# Patient Record
Sex: FEMALE | Race: WHITE | NOT HISPANIC OR LATINO | Employment: OTHER | URBAN - METROPOLITAN AREA
[De-identification: names, ages, dates, MRNs, and addresses within clinical notes are randomized per-mention and may not be internally consistent; named-entity substitution may affect disease eponyms.]

---

## 2017-02-23 ENCOUNTER — OFFICE VISIT (OUTPATIENT)
Dept: ENDOCRINOLOGY | Facility: CLINIC | Age: 67
End: 2017-02-23

## 2017-02-23 VITALS
BODY MASS INDEX: 31.72 KG/M2 | DIASTOLIC BLOOD PRESSURE: 80 MMHG | HEART RATE: 80 BPM | WEIGHT: 168 LBS | HEIGHT: 61 IN | OXYGEN SATURATION: 98 % | SYSTOLIC BLOOD PRESSURE: 140 MMHG

## 2017-02-23 DIAGNOSIS — E78.2 MIXED HYPERLIPIDEMIA: ICD-10-CM

## 2017-02-23 DIAGNOSIS — I10 ESSENTIAL HYPERTENSION: ICD-10-CM

## 2017-02-23 DIAGNOSIS — E11.9 TYPE 2 DIABETES MELLITUS WITHOUT COMPLICATION, UNSPECIFIED LONG TERM INSULIN USE STATUS: Primary | ICD-10-CM

## 2017-02-23 LAB — GLUCOSE BLDC GLUCOMTR-MCNC: 204 MG/DL (ref 70–130)

## 2017-02-23 PROCEDURE — 82947 ASSAY GLUCOSE BLOOD QUANT: CPT | Performed by: INTERNAL MEDICINE

## 2017-02-23 PROCEDURE — 99214 OFFICE O/P EST MOD 30 MIN: CPT | Performed by: INTERNAL MEDICINE

## 2017-02-23 RX ORDER — HYDROCHLOROTHIAZIDE 25 MG/1
TABLET ORAL
COMMUNITY
Start: 2017-02-21

## 2017-02-23 RX ORDER — IBUPROFEN 800 MG/1
TABLET ORAL
COMMUNITY
Start: 2017-01-08

## 2017-02-23 RX ORDER — HYDRALAZINE HYDROCHLORIDE 50 MG/1
TABLET, FILM COATED ORAL
COMMUNITY
Start: 2017-02-07

## 2017-02-23 RX ORDER — AMLODIPINE BESYLATE 5 MG/1
TABLET ORAL
COMMUNITY
Start: 2017-02-17

## 2017-02-23 NOTE — PROGRESS NOTES
Tracy AFTAB Mcclelland 66 y.o.  CC:Follow-up; Diabetes (Type II, Labs done at Dr Norman office 3 weeks ago, A1C was 8.4%); Hypothyroidism; Hypertension; and Hyperlipidemia      [place]: Follow-up; Diabetes (Type II, Labs done at Dr Norman office 3 weeks ago, A1C was 8.4%); Hypothyroidism; Hypertension; and Hyperlipidemia    Blood sugar and 90 day average sugar reviewed  Results for orders placed or performed in visit on 02/23/17   POC Glucose Fingerstick   Result Value Ref Range    Glucose 204 (A) 70 - 130 mg/dL     Average via Dr Norman was 195 (hgn a1c 8.4%)  Energy good  bp is good   Is following a low fat diet  Is up to date with eye exam  Neuropathy stable without foot callus or ulcer  Ur alb pos 10/16  Lipids reviewed- intol statin therapy   Sugars are higher - she assoc with pain and ibuprofen  Discussed raising insulin by 2 units (n and r)   Discussed taking r before meals   Is sedentary   Is eating more due to stress  Discussed adjustment of insulin for higher sugar and lower sugar  Lowest sugar 85  Is testing sugars 2 times daily  Discussed hep c screening   Intol statin due to leg pain     Allergies   Allergen Reactions   • Adhesive Tape    • Astelin [Azelastine Hcl]    • Azelastine    • Maxitrol  [Neomycin-Polymyxin-Dexameth]    • Neomycin-Bacitracin Zn-Polymyx    • Niacin    • Topiramate        Current Outpatient Prescriptions:   •  acetaminophen (ARTHRITIS PAIN RELIEF) 650 MG 8 hr tablet, Take  by mouth., Disp: , Rfl:   •  buPROPion XL (WELLBUTRIN XL) 150 MG 24 hr tablet, Take  by mouth daily., Disp: , Rfl:   •  carBAMazepine XR (TEGretol  XR) 200 MG 12 hr tablet, Take  by mouth., Disp: , Rfl:   •  carBAMazepine XR (TEGRETOL-XR) 100 MG 12 hr tablet, Take  by mouth daily., Disp: , Rfl:   •  carisoprodol (SOMA) 350 MG tablet, Take  by mouth., Disp: , Rfl:   •  clobetasol (TEMOVATE) 0.05 % cream, , Disp: , Rfl:   •  clotrimazole (LOTRIMIN) 1 % external solution, Apply  topically., Disp: , Rfl:   •  diclofenac  (VOLTAREN) 1 % gel gel, Place  on the skin., Disp: , Rfl:   •  Difluprednate (DUREZOL) 0.05 % emulsion, Apply  to eye., Disp: , Rfl:   •  dorzolamide (TRUSOPT) 2 % ophthalmic solution, Apply  to eye 2 (two) times a day., Disp: , Rfl:   •  Glucosamine-Chondroit-Vit C-Mn (GLUCOSAMINE CHONDR 1500 COMPLX PO), Take 2 capsules by mouth 2 (two) times a day., Disp: , Rfl:   •  glucose blood (ACCU-CHEK KEN PLUS) test strip, 3 (three) times a day., Disp: , Rfl:   •  HYDROcodone-acetaminophen (NORCO) 7.5-325 MG per tablet, , Disp: , Rfl:   •  insulin NPH (HumuLIN,NovoLIN) 100 UNIT/ML injection, Take 20 units sq q am and 30 units sq q hs, Disp: 50 mL, Rfl: 1  •  insulin regular (NOVOLIN R) 100 UNIT/ML injection, 10-15 units tid before meals, Disp: 50 mL, Rfl: 1  •  levothyroxine (SYNTHROID, LEVOTHROID) 112 MCG tablet, Take 1 tablet by mouth., Disp: , Rfl:   •  losartan (COZAAR) 100 MG tablet, Take 1 tablet by mouth Daily., Disp: , Rfl:   •  promethazine (PHENERGAN) 25 MG tablet, Take  by mouth., Disp: , Rfl:   •  tolterodine LA (DETROL LA) 4 MG 24 hr capsule, Take  by mouth daily., Disp: , Rfl:   •  traZODone (DESYREL) 150 MG tablet, Take  by mouth., Disp: , Rfl:   Patient Active Problem List    Diagnosis   • Breast pain [N64.4]   • Hyperlipidemia [E78.5]     Overview Note:     Impression: 02/09/2016 - high LDL but does not tolerate statin or fibrates   will not follow because not able to treat (would be candidate for praluent etc if she has cardiac event).  Impression: 11/09/2015 - check flp  Impression: 02/02/2015 - discussed flps  Impression: 10/30/2014 - check flp  Impression: 07/31/2014 - get copy labs from [facility]  Impression: 04/30/2014 - flp via Dr Norman- on crestor 3 days a week.;      • Hypertension [I10]     Overview Note:     Impression: 02/09/2016 - bp 144/74   continue current medications   creatinine normal last check  Impression: 11/09/2015 - bp is good  Impression: 02/02/2015 - bp is good  Impression:  10/30/2014 - bp is good  Impression: 07/31/2014 - bp is good  Impression: 04/30/2014 - bp is good;      • Hyperthyroidism [E05.90]     Overview Note:     Impression: 02/09/2016 - normal tsh last check  Impression: 11/09/2015 - check tfts  Impression: 02/02/2015 - check tfts  Impression: 10/30/2014 - update tft  Impression: 04/30/2014 - update order for tft given;      • Paresthesia [R20.2]   • Type 2 diabetes mellitus [E11.9]     Overview Note:     Impression: 02/09/2016 - blood sugar is 127 and hgn a1c 9.5%   average sugar is 225 or so    is up to date with eye exam   no neuropathy   reviewed lab work with her   recommendations discussed but I am afraid that she will not have coverage or insulin.  She was encouraged to work with pharmacist.  Lantus and novolog not covered so we prescribed levemir and humalog   we may need to switch to relion branded products for cost- she is unable to afford    insurance does not even cover metformin per patient- she was told this was free at [facility]  Impression: 11/09/2015 - steady increase in hgn a1c    is missing daytime shots   is up to date on eye exam   neuropathy stable- no callus/ulcer   ur alb due   discussed goals, fasting sugars are good   continue to try to get shots in with meals- some of her sugars are over 500 and we discussed damage done with higher levels  Impression: 02/02/2015 - discussed higher sugars and goals   blood sugar 119, hgn a1c 8.6% '  trial lantus 50 u daily   novolog adjustment discussed   chore sheet provided  is up to date with eye exam  neuropathy is stable- no foot callus or ulcer   ur alb today  Impression: 02/02/2015 - discussed higher sugars and goals   blood sugar 119, hgn a1c 8.6%   is up to date with eye exam  neuropathy is stable- no foot callus or ulcer   ur alb today  Impression: 10/30/2014 - blood sugar 140, hgn a1c 7.8%  is up to date with eye exam  no foot lesion  bp is good  Impression: 07/31/2014 - blood sugar is good but average  sugar is higher- increase novolog 1 u per 3 carbs, lowers her 10 points  has lost 7 lbs and sugars are better  continue current dose of Lantus  is up to date with eye examm, no neuropathy  ur alb due  Impression: 04/30/2014 - blood sugar 130, hgn a1c 9.8% (average 240 or so)  discussed with patient and reinforced need for her to begin to exercise again, cut back on dietary carbs. Carb hand out was given.  Discussed insulin to carb ratio.  1 u per 7 carbs and if pp blood sugar is high then continue to titrate mealtime insulin.  Goal and risk of complications was reviewed with her at length, gave blood sugar grid and she can email anytime.;      • Vitamin D deficiency [E55.9]     Overview Note:     Impression: 02/09/2016 - low vitamin D level   recommended supplement   she states she cannot afford it  Impression: 11/09/2015 - update vitamin D levels  Impression: 07/31/2014 - continue vitamin D  Impression: 04/30/2014 - check vitamin D levels- order given;        Review of Systems   Constitutional: Negative for activity change, appetite change, chills, diaphoresis, fatigue, fever and unexpected weight change.   HENT: Negative for congestion, dental problem, drooling, ear discharge, ear pain, facial swelling, hearing loss, mouth sores, nosebleeds, postnasal drip, rhinorrhea, sinus pressure, sneezing, sore throat, tinnitus, trouble swallowing and voice change.    Eyes: Negative for photophobia, pain, discharge, redness, itching and visual disturbance.        Diplopia os- following with normal MRI    Respiratory: Negative for apnea, cough, choking, chest tightness, shortness of breath, wheezing and stridor.    Cardiovascular: Negative for chest pain, palpitations and leg swelling.   Gastrointestinal: Negative for abdominal distention, abdominal pain, anal bleeding, blood in stool, constipation, diarrhea, nausea, rectal pain and vomiting.   Endocrine: Negative for cold intolerance, heat intolerance, polydipsia, polyphagia  "and polyuria.   Genitourinary: Negative for decreased urine volume, difficulty urinating, dysuria, enuresis, flank pain, frequency, genital sores, hematuria and urgency.   Musculoskeletal: Negative for arthralgias, back pain, gait problem, joint swelling, myalgias, neck pain and neck stiffness.        Lower back and left shoulder pain  Told she had pinched nerve in neck    Skin: Negative for color change, pallor, rash and wound.        Foot deformity with cut medial toe left foot    Allergic/Immunologic: Negative for environmental allergies, food allergies and immunocompromised state.   Neurological: Positive for numbness. Negative for dizziness, tremors, seizures, syncope, facial asymmetry, speech difficulty, weakness, light-headedness and headaches.   Hematological: Negative for adenopathy. Does not bruise/bleed easily.   Psychiatric/Behavioral: Negative for agitation, behavioral problems, confusion, decreased concentration, dysphoric mood, hallucinations, self-injury, sleep disturbance and suicidal ideas. The patient is not nervous/anxious and is not hyperactive.      Social History     Social History   • Marital status:      Spouse name: N/A   • Number of children: N/A   • Years of education: N/A     Occupational History   • Not on file.     Social History Main Topics   • Smoking status: Former Smoker   • Smokeless tobacco: Never Used   • Alcohol use No   • Drug use: No   • Sexual activity: Not on file     Other Topics Concern   • Not on file     Social History Narrative     Family History   Problem Relation Age of Onset   • Diabetes Mother    • Hypertension Father    • Other Father      cardiac disorder   • Cancer Sister    • Dementia Other    • Diabetes Other      Visit Vitals   • /80   • Pulse 80   • Ht 61\" (154.9 cm)   • Wt 168 lb (76.2 kg)   • SpO2 98%   • BMI 31.74 kg/m2     Physical Exam   Constitutional: She is oriented to person, place, and time. She appears well-developed and " well-nourished.   HENT:   Head: Normocephalic and atraumatic.   Nose: Nose normal.   Mouth/Throat: Oropharynx is clear and moist.   Eyes: Conjunctivae, EOM and lids are normal. Pupils are equal, round, and reactive to light.   Neck: Trachea normal and normal range of motion. Neck supple. Carotid bruit is not present. No tracheal deviation present. No thyroid mass and no thyromegaly present.   Cardiovascular: Normal rate, regular rhythm, normal heart sounds and intact distal pulses.  Exam reveals no gallop and no friction rub.    No murmur heard.  Pulmonary/Chest: Effort normal and breath sounds normal. No respiratory distress. She has no wheezes.   Musculoskeletal: Normal range of motion. She exhibits no edema or deformity.    Diabetic foot exam performed: callus between 3rd and 4th toes- callus - discussed  toe spacer     Neurological Sensory Findings - Altered hot/cold right ankle/foot discrimination and altered hot/cold left ankle/foot discrimination. Altered sharp/dull right ankle/foot discrimination and altered sharp/dull left ankle/foot discrimination. Right ankle/foot altered proprioception and left ankle/foot altered proprioception.    Vascular Status -  Her exam exhibits right foot vasculature normal. Her exam exhibits no right foot edema. Her exam exhibits left foot vasculature normal. Her exam exhibits no left foot edema.   Skin Integrity  -  Her right foot skin is intact.     Tracy 's left foot skin is intact. .  Lymphadenopathy:     She has no cervical adenopathy.   Neurological: She is alert and oriented to person, place, and time. She has normal reflexes. She displays normal reflexes. No cranial nerve deficit.   Skin: Skin is warm and dry. No rash noted. No cyanosis or erythema. Nails show no clubbing.   Psychiatric: She has a normal mood and affect. Her speech is normal and behavior is normal. Judgment and thought content normal. Cognition and memory are normal.   Nursing note and vitals  reviewed.    Results for orders placed or performed in visit on 10/03/16   TSH   Result Value Ref Range    TSH 3.094 0.350 - 5.350 mIU/mL   Microalbumin / Creatinine Urine Ratio   Result Value Ref Range    Creatinine, Urine 147.9 Not Estab. mg/dL    Microalbumin, Urine 120.4 Not Estab. ug/mL    Microalbumin/Creatinine Ratio Urine 81.4 (H) 0.0 - 30.0 mg/g creat   Comprehensive Metabolic Panel   Result Value Ref Range    Glucose 66 (L) 70 - 100 mg/dL    BUN 13 9 - 23 mg/dL    Creatinine 0.70 0.60 - 1.30 mg/dL    Sodium 147 (H) 132 - 146 mmol/L    Potassium 4.8 3.5 - 5.5 mmol/L    Chloride 105 99 - 109 mmol/L    CO2 34.0 (H) 20.0 - 31.0 mmol/L    Calcium 9.7 8.7 - 10.4 mg/dL    Total Protein 6.4 5.7 - 8.2 g/dL    Albumin 3.90 3.20 - 4.80 g/dL    ALT (SGPT) 22 7 - 40 U/L    AST (SGOT) 16 0 - 33 U/L    Alkaline Phosphatase 70 25 - 100 U/L    Total Bilirubin 0.2 (L) 0.3 - 1.2 mg/dL    eGFR Non African Amer 83 >60 mL/min/1.73    Globulin 2.5 gm/dL    A/G Ratio 1.6 g/dL    BUN/Creatinine Ratio 18.6 7.0 - 25.0    Anion Gap 8.0 3.0 - 11.0 mmol/L   Lipid Panel   Result Value Ref Range    Total Cholesterol 247 (H) 0 - 200 mg/dL    Triglycerides 273 (H) 0 - 150 mg/dL    HDL Cholesterol 49 40 - 60 mg/dL    LDL Cholesterol  161 (H) 0 - 130 mg/dL   Vitamin D 25 Hydroxy   Result Value Ref Range    25 Hydroxy, Vitamin D 43.9 ng/ml   POC Glucose Fingerstick   Result Value Ref Range    Glucose 83 70 - 130 mg/dL     Problem List Items Addressed This Visit        Cardiovascular and Mediastinum    Hyperlipidemia     Intol statin on low fat diet  Encouraged her to exercise          Hypertension     Is on multiple medications- losartan benefits discussed          Relevant Medications    amLODIPine (NORVASC) 5 MG tablet    hydrALAZINE (APRESOLINE) 50 MG tablet    hydrochlorothiazide (HYDRODIURIL) 25 MG tablet       Endocrine    Type 2 diabetes mellitus - Primary     Blood sugar and 90 day average sugar reviewed  hgn a1c 8.4%   Discussed  raising all insulin by 2 units  Low fat diet  Continue arb  Neuropathy stable without foot lesion other than callus between left 3/4 toes  Recommend continue eye exam  Yearly   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    .Aleyda Sierra MA

## 2017-02-23 NOTE — ASSESSMENT & PLAN NOTE
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

## 2017-06-27 ENCOUNTER — OFFICE VISIT (OUTPATIENT)
Dept: ENDOCRINOLOGY | Facility: CLINIC | Age: 67
End: 2017-06-27

## 2017-06-27 VITALS
WEIGHT: 161 LBS | SYSTOLIC BLOOD PRESSURE: 158 MMHG | DIASTOLIC BLOOD PRESSURE: 74 MMHG | HEIGHT: 61 IN | BODY MASS INDEX: 30.4 KG/M2

## 2017-06-27 DIAGNOSIS — E11.9 TYPE 2 DIABETES MELLITUS WITHOUT COMPLICATION, UNSPECIFIED LONG TERM INSULIN USE STATUS: Primary | ICD-10-CM

## 2017-06-27 DIAGNOSIS — E03.9 ACQUIRED HYPOTHYROIDISM: ICD-10-CM

## 2017-06-27 DIAGNOSIS — E78.2 MIXED HYPERLIPIDEMIA: ICD-10-CM

## 2017-06-27 DIAGNOSIS — I10 ESSENTIAL HYPERTENSION: ICD-10-CM

## 2017-06-27 DIAGNOSIS — E55.9 VITAMIN D DEFICIENCY: ICD-10-CM

## 2017-06-27 LAB
ALBUMIN SERPL-MCNC: 4.3 G/DL (ref 3.2–4.8)
ALBUMIN/GLOB SERPL: 1.9 G/DL (ref 1.5–2.5)
ALP SERPL-CCNC: 98 U/L (ref 25–100)
ALT SERPL W P-5'-P-CCNC: 14 U/L (ref 7–40)
ANION GAP SERPL CALCULATED.3IONS-SCNC: 10 MMOL/L (ref 3–11)
ARTICHOKE IGE QN: 196 MG/DL (ref 0–130)
AST SERPL-CCNC: 17 U/L (ref 0–33)
BASOPHILS # BLD AUTO: 0.03 10*3/MM3 (ref 0–0.2)
BASOPHILS NFR BLD AUTO: 0.3 % (ref 0–1)
BILIRUB SERPL-MCNC: 0.2 MG/DL (ref 0.3–1.2)
BUN BLD-MCNC: 18 MG/DL (ref 9–23)
BUN/CREAT SERPL: 20 (ref 7–25)
CALCIUM SPEC-SCNC: 9.7 MG/DL (ref 8.7–10.4)
CHLORIDE SERPL-SCNC: 98 MMOL/L (ref 99–109)
CHOLEST SERPL-MCNC: 299 MG/DL (ref 0–200)
CO2 SERPL-SCNC: 31 MMOL/L (ref 20–31)
CREAT BLD-MCNC: 0.9 MG/DL (ref 0.6–1.3)
DEPRECATED RDW RBC AUTO: 44.6 FL (ref 37–54)
EOSINOPHIL # BLD AUTO: 0.12 10*3/MM3 (ref 0–0.3)
EOSINOPHIL NFR BLD AUTO: 1.3 % (ref 0–3)
ERYTHROCYTE [DISTWIDTH] IN BLOOD BY AUTOMATED COUNT: 12.2 % (ref 11.3–14.5)
GFR SERPL CREATININE-BSD FRML MDRD: 62 ML/MIN/1.73
GLOBULIN UR ELPH-MCNC: 2.3 GM/DL
GLUCOSE BLD-MCNC: 260 MG/DL (ref 70–100)
GLUCOSE BLDC GLUCOMTR-MCNC: 275 MG/DL (ref 70–130)
HBA1C MFR BLD: 8.6 %
HCT VFR BLD AUTO: 41.2 % (ref 34.5–44)
HDLC SERPL-MCNC: 52 MG/DL (ref 40–60)
HGB BLD-MCNC: 14 G/DL (ref 11.5–15.5)
IMM GRANULOCYTES # BLD: 0.03 10*3/MM3 (ref 0–0.03)
IMM GRANULOCYTES NFR BLD: 0.3 % (ref 0–0.6)
LYMPHOCYTES # BLD AUTO: 1.98 10*3/MM3 (ref 0.6–4.8)
LYMPHOCYTES NFR BLD AUTO: 22 % (ref 24–44)
MCH RBC QN AUTO: 34.1 PG (ref 27–31)
MCHC RBC AUTO-ENTMCNC: 34 G/DL (ref 32–36)
MCV RBC AUTO: 100.2 FL (ref 80–99)
MONOCYTES # BLD AUTO: 0.66 10*3/MM3 (ref 0–1)
MONOCYTES NFR BLD AUTO: 7.3 % (ref 0–12)
NEUTROPHILS # BLD AUTO: 6.2 10*3/MM3 (ref 1.5–8.3)
NEUTROPHILS NFR BLD AUTO: 68.8 % (ref 41–71)
PLATELET # BLD AUTO: 230 10*3/MM3 (ref 150–450)
PMV BLD AUTO: 10.1 FL (ref 6–12)
POTASSIUM BLD-SCNC: 3.6 MMOL/L (ref 3.5–5.5)
PROT SERPL-MCNC: 6.6 G/DL (ref 5.7–8.2)
RBC # BLD AUTO: 4.11 10*6/MM3 (ref 3.89–5.14)
SODIUM BLD-SCNC: 139 MMOL/L (ref 132–146)
T4 FREE SERPL-MCNC: 1.08 NG/DL (ref 0.89–1.76)
TRIGL SERPL-MCNC: 418 MG/DL (ref 0–150)
TSH SERPL DL<=0.05 MIU/L-ACNC: 1.66 MIU/ML (ref 0.35–5.35)
WBC NRBC COR # BLD: 9.02 10*3/MM3 (ref 3.5–10.8)

## 2017-06-27 PROCEDURE — 82947 ASSAY GLUCOSE BLOOD QUANT: CPT | Performed by: INTERNAL MEDICINE

## 2017-06-27 PROCEDURE — 80053 COMPREHEN METABOLIC PANEL: CPT | Performed by: INTERNAL MEDICINE

## 2017-06-27 PROCEDURE — 83036 HEMOGLOBIN GLYCOSYLATED A1C: CPT | Performed by: INTERNAL MEDICINE

## 2017-06-27 PROCEDURE — 80061 LIPID PANEL: CPT | Performed by: INTERNAL MEDICINE

## 2017-06-27 PROCEDURE — 85025 COMPLETE CBC W/AUTO DIFF WBC: CPT | Performed by: INTERNAL MEDICINE

## 2017-06-27 PROCEDURE — 84439 ASSAY OF FREE THYROXINE: CPT | Performed by: INTERNAL MEDICINE

## 2017-06-27 PROCEDURE — 84443 ASSAY THYROID STIM HORMONE: CPT | Performed by: INTERNAL MEDICINE

## 2017-06-27 PROCEDURE — 99214 OFFICE O/P EST MOD 30 MIN: CPT | Performed by: INTERNAL MEDICINE

## 2017-06-27 RX ORDER — IPRATROPIUM BROMIDE 42 UG/1
SPRAY, METERED NASAL
COMMUNITY
Start: 2017-06-23

## 2017-06-27 RX ORDER — NYSTATIN 100000 [USP'U]/G
POWDER TOPICAL
COMMUNITY
Start: 2017-06-22

## 2017-06-27 RX ORDER — IPRATROPIUM BROMIDE 21 UG/1
SPRAY, METERED NASAL
COMMUNITY
Start: 2017-05-02

## 2017-06-27 NOTE — ASSESSMENT & PLAN NOTE
bp is high -recheck 148/80  No exercise  Is not watching salt intake  Dr Norman just adjusted medication   Continue current medications   Check bp at home and call if over 145/85

## 2017-06-27 NOTE — PROGRESS NOTES
Tracy UGALDE Stas 66 y.o.  CC:Follow-up; Diabetes Mellitus (Type II, last eye exam was within the last six months by Dr Jaramillo); Hypothyroidism; Hypertension; and Hyperlipidemia    [place]: Follow-up; Diabetes Mellitus (Type II, last eye exam was within the last six months by Dr Jaramillo); Hypothyroidism; Hypertension; and Hyperlipidemia    Blood sugar 275 and hgn a1c 8.6%  Average sugar is 195   This is stable compared to last check 8.4%  Blood sugars reviewed and range from 72- 350 or so  Discussed goal for average sugar 7 or less (corresponding to average sugar of 150)  She is trying to get  in Nursing Home, planned surgery   He is on several waiting lists   Back surgery planned (Dr Álvarez)  Sugars have been higher, discussed increase in R to 20 units before meals  No low sugars (rare) - occ noct but no pp   utd eye exam- Dr Jaramillo   bp is higher   A lot of stress related to caring for her    On arb but not on statin     Allergies   Allergen Reactions   • Adhesive Tape    • Astelin [Azelastine Hcl]    • Azelastine    • Maxitrol  [Neomycin-Polymyxin-Dexameth]    • Neomycin-Bacitracin Zn-Polymyx    • Niacin    • Statins Myalgia     Tried crestor, lipitor, pravastatin, zocor    • Topiramate        Current Outpatient Prescriptions:   •  acetaminophen (ARTHRITIS PAIN RELIEF) 650 MG 8 hr tablet, Take  by mouth., Disp: , Rfl:   •  amLODIPine (NORVASC) 5 MG tablet, , Disp: , Rfl:   •  buPROPion XL (WELLBUTRIN XL) 150 MG 24 hr tablet, Take  by mouth daily., Disp: , Rfl:   •  carBAMazepine XR (TEGretol  XR) 200 MG 12 hr tablet, Take  by mouth., Disp: , Rfl:   •  carBAMazepine XR (TEGRETOL-XR) 100 MG 12 hr tablet, Take  by mouth daily., Disp: , Rfl:   •  insulin NPH (HumuLIN,NovoLIN) 100 UNIT/ML injection, Take 20 units sq q am and 30 units sq q hs, Disp: 50 mL, Rfl: 1  •  insulin regular (NOVOLIN R) 100 UNIT/ML injection, 10-15 units tid before meals, Disp: 50 mL, Rfl: 1  •  levothyroxine (SYNTHROID, LEVOTHROID)  112 MCG tablet, Take 1 tablet by mouth., Disp: , Rfl:   •  losartan (COZAAR) 100 MG tablet, Take 1 tablet by mouth Daily., Disp: , Rfl:   •  carisoprodol (SOMA) 350 MG tablet, Take  by mouth., Disp: , Rfl:   •  clobetasol (TEMOVATE) 0.05 % cream, , Disp: , Rfl:   •  clotrimazole (LOTRIMIN) 1 % external solution, Apply  topically., Disp: , Rfl:   •  Difluprednate (DUREZOL) 0.05 % emulsion, Apply  to eye., Disp: , Rfl:   •  dorzolamide (TRUSOPT) 2 % ophthalmic solution, Apply  to eye 2 (two) times a day., Disp: , Rfl:   •  Glucosamine-Chondroit-Vit C-Mn (GLUCOSAMINE CHONDR 1500 COMPLX PO), Take 2 capsules by mouth 2 (two) times a day., Disp: , Rfl:   •  glucose blood (ACCU-CHEK KEN PLUS) test strip, 3 (three) times a day., Disp: , Rfl:   •  hydrALAZINE (APRESOLINE) 50 MG tablet, Take one tablet TID, Disp: , Rfl:   •  hydrochlorothiazide (HYDRODIURIL) 25 MG tablet, Take 1/2 tablet PO daily, Disp: , Rfl:   •  HYDROcodone-acetaminophen (NORCO) 7.5-325 MG per tablet, , Disp: , Rfl:   •  ibuprofen (ADVIL,MOTRIN) 800 MG tablet, Take one tablet q 8 hr prn, Disp: , Rfl:   •  traZODone (DESYREL) 150 MG tablet, Take  by mouth., Disp: , Rfl:   Patient Active Problem List    Diagnosis   • Breast pain [N64.4]   • Hyperlipidemia [E78.5]     Overview Note:     Impression: 02/09/2016 - high LDL but does not tolerate statin or fibrates   will not follow because not able to treat (would be candidate for praluent etc if she has cardiac event).  Impression: 11/09/2015 - check flp  Impression: 02/02/2015 - discussed flps  Impression: 10/30/2014 - check flp  Impression: 07/31/2014 - get copy labs from [facility]  Impression: 04/30/2014 - flp via Dr Norman- on crestor 3 days a week.;      • Hypertension [I10]     Overview Note:     Impression: 02/09/2016 - bp 144/74   continue current medications   creatinine normal last check  Impression: 11/09/2015 - bp is good  Impression: 02/02/2015 - bp is good  Impression: 10/30/2014 - bp is good   Impression: 07/31/2014 - bp is good  Impression: 04/30/2014 - bp is good;      • Hyperthyroidism [E05.90]     Overview Note:     Impression: 02/09/2016 - normal tsh last check  Impression: 11/09/2015 - check tfts  Impression: 02/02/2015 - check tfts  Impression: 10/30/2014 - update tft  Impression: 04/30/2014 - update order for tft given;      • Paresthesia [R20.2]   • Type 2 diabetes mellitus [E11.9]     Overview Note:     Impression: 02/09/2016 - blood sugar is 127 and hgn a1c 9.5%   average sugar is 225 or so    is up to date with eye exam   no neuropathy   reviewed lab work with her   recommendations discussed but I am afraid that she will not have coverage or insulin.  She was encouraged to work with pharmacist.  Lantus and novolog not covered so we prescribed levemir and humalog   we may need to switch to relion branded products for cost- she is unable to afford    insurance does not even cover metformin per patient- she was told this was free at [facility]  Impression: 11/09/2015 - steady increase in hgn a1c    is missing daytime shots   is up to date on eye exam   neuropathy stable- no callus/ulcer   ur alb due   discussed goals, fasting sugars are good   continue to try to get shots in with meals- some of her sugars are over 500 and we discussed damage done with higher levels  Impression: 02/02/2015 - discussed higher sugars and goals   blood sugar 119, hgn a1c 8.6% '  trial lantus 50 u daily   novolog adjustment discussed   chore sheet provided  is up to date with eye exam  neuropathy is stable- no foot callus or ulcer   ur alb today  Impression: 02/02/2015 - discussed higher sugars and goals   blood sugar 119, hgn a1c 8.6%   is up to date with eye exam  neuropathy is stable- no foot callus or ulcer   ur alb today  Impression: 10/30/2014 - blood sugar 140, hgn a1c 7.8%  is up to date with eye exam  no foot lesion  bp is good  Impression: 07/31/2014 - blood sugar is good but average sugar is higher- increase  novolog 1 u per 3 carbs, lowers her 10 points  has lost 7 lbs and sugars are better  continue current dose of Lantus  is up to date with eye examm, no neuropathy  ur alb due  Impression: 04/30/2014 - blood sugar 130, hgn a1c 9.8% (average 240 or so)  discussed with patient and reinforced need for her to begin to exercise again, cut back on dietary carbs. Carb hand out was given.  Discussed insulin to carb ratio.  1 u per 7 carbs and if pp blood sugar is high then continue to titrate mealtime insulin.  Goal and risk of complications was reviewed with her at length, gave blood sugar grid and she can email anytime.;      • Vitamin D deficiency [E55.9]     Overview Note:     Impression: 02/09/2016 - low vitamin D level   recommended supplement   she states she cannot afford it  Impression: 11/09/2015 - update vitamin D levels  Impression: 07/31/2014 - continue vitamin D  Impression: 04/30/2014 - check vitamin D levels- order given;        Review of Systems   Constitutional: Negative for activity change, appetite change, chills, diaphoresis, fatigue, fever and unexpected weight change.   HENT: Negative for congestion, dental problem, drooling, ear discharge, ear pain, facial swelling, hearing loss, mouth sores, nosebleeds, postnasal drip, rhinorrhea, sinus pressure, sneezing, sore throat, tinnitus, trouble swallowing and voice change.    Eyes: Negative for photophobia, pain, discharge, redness, itching and visual disturbance.   Respiratory: Negative for apnea, cough, choking, chest tightness, shortness of breath, wheezing and stridor.    Cardiovascular: Negative for chest pain, palpitations and leg swelling.   Gastrointestinal: Negative for abdominal distention, abdominal pain, anal bleeding, blood in stool, constipation, diarrhea, nausea, rectal pain and vomiting.   Endocrine: Negative for cold intolerance, heat intolerance, polydipsia, polyphagia and polyuria.   Genitourinary: Negative for decreased urine volume,  "difficulty urinating, dysuria, enuresis, flank pain, frequency, genital sores, hematuria and urgency.   Musculoskeletal: Positive for back pain and neck pain. Negative for arthralgias, gait problem, joint swelling, myalgias and neck stiffness.   Skin: Negative for color change, pallor, rash and wound.   Allergic/Immunologic: Negative for environmental allergies, food allergies and immunocompromised state.   Neurological: Negative for dizziness, tremors, seizures, syncope, facial asymmetry, speech difficulty, weakness, light-headedness, numbness and headaches.   Hematological: Negative for adenopathy. Does not bruise/bleed easily.   Psychiatric/Behavioral: Negative for agitation, behavioral problems, confusion, decreased concentration, dysphoric mood, hallucinations, self-injury, sleep disturbance and suicidal ideas. The patient is not nervous/anxious and is not hyperactive.      Social History     Social History   • Marital status:      Spouse name: N/A   • Number of children: N/A   • Years of education: N/A     Occupational History   • Not on file.     Social History Main Topics   • Smoking status: Former Smoker   • Smokeless tobacco: Never Used   • Alcohol use No   • Drug use: No   • Sexual activity: Not on file     Other Topics Concern   • Not on file     Social History Narrative     Family History   Problem Relation Age of Onset   • Diabetes Mother    • Hypertension Father    • Other Father      cardiac disorder   • Cancer Sister    • Dementia Other    • Diabetes Other      /74  Ht 61\" (154.9 cm)  Wt 161 lb (73 kg)  BMI 30.42 kg/m2  Physical Exam   Constitutional: She is oriented to person, place, and time. She appears well-developed and well-nourished.   HENT:   Head: Normocephalic and atraumatic.   Nose: Nose normal.   Mouth/Throat: Oropharynx is clear and moist.   Eyes: Conjunctivae, EOM and lids are normal. Pupils are equal, round, and reactive to light.   Neck: Trachea normal and normal " range of motion. Neck supple. Carotid bruit is not present. No tracheal deviation present. No thyroid mass and no thyromegaly present.   Cardiovascular: Normal rate, regular rhythm, normal heart sounds and intact distal pulses.  Exam reveals no gallop and no friction rub.    No murmur heard.  Pulmonary/Chest: Effort normal and breath sounds normal. No respiratory distress. She has no wheezes.   Musculoskeletal: Normal range of motion. She exhibits no edema or deformity.       Neurological Sensory Findings - Altered hot/cold right ankle/foot discrimination and altered hot/cold left ankle/foot discrimination. Altered sharp/dull right ankle/foot discrimination and altered sharp/dull left ankle/foot discrimination. Right ankle/foot altered proprioception and left ankle/foot altered proprioception.    Vascular Status -  Her exam exhibits right foot vasculature normal. Her exam exhibits no right foot edema. Her exam exhibits left foot vasculature normal. Her exam exhibits no left foot edema.   Skin Integrity  -  Her right foot skin is intact.     Tracy 's left foot skin is intact. .  Lymphadenopathy:     She has no cervical adenopathy.   Neurological: She is alert and oriented to person, place, and time. She has normal reflexes. She displays normal reflexes. No cranial nerve deficit.   Skin: Skin is warm and dry. No rash noted. No cyanosis or erythema. Nails show no clubbing.   Psychiatric: She has a normal mood and affect. Her speech is normal and behavior is normal. Judgment and thought content normal. Cognition and memory are normal.   Nursing note and vitals reviewed.    Results for orders placed or performed in visit on 02/23/17   POC Glucose Fingerstick   Result Value Ref Range    Glucose 204 (A) 70 - 130 mg/dL     Tracy was seen today for follow-up, diabetes mellitus, hypothyroidism, hypertension and hyperlipidemia.    Diagnoses and all orders for this visit:    Type 2 diabetes mellitus without complication,  unspecified long term insulin use status  -     POC Glycosylated Hemoglobin (Hb A1C)  -     POC Glucose Fingerstick      Problem List Items Addressed This Visit        Cardiovascular and Mediastinum    Hyperlipidemia     Check flp   Cc Dr Norman          Relevant Orders    Lipid Panel    Hypertension     bp is high -recheck 148/80  No exercise  Is not watching salt intake  Dr Norman just adjusted medication   Continue current medications   Check bp at home and call if over 145/85         Relevant Orders    CBC & Differential       Digestive    Vitamin D deficiency     Continue supplement and test yearly             Endocrine    Acquired hypothyroidism     Check tfts          Relevant Orders    T4, Free    TSH    Type 2 diabetes mellitus - Primary     Blood sugar and 90 day average sugar reviewed  Results for orders placed or performed in visit on 06/27/17   POC Glycosylated Hemoglobin (Hb A1C)   Result Value Ref Range    Hemoglobin A1C 8.6 %   POC Glucose Fingerstick   Result Value Ref Range    Glucose 275 (A) 70 - 130 mg/dL     Goal hgn a1c 7 or less discussed  She is up to date with eye exam  Neuropathy with ingrown nail right gt toe- discussed podiatry   Has high ur alb - on arb   bp sub optimal - monitor and titrate amlodipine prn          Relevant Orders    POC Glycosylated Hemoglobin (Hb A1C) (Completed)    POC Glucose Fingerstick (Completed)    Comprehensive Metabolic Panel        Return in about 4 months (around 10/27/2017) for Recheck 30 min .    Aleyda Sierra MA  Signed Marie Lai MD

## 2017-06-27 NOTE — ASSESSMENT & PLAN NOTE
Blood sugar and 90 day average sugar reviewed  Results for orders placed or performed in visit on 06/27/17   POC Glycosylated Hemoglobin (Hb A1C)   Result Value Ref Range    Hemoglobin A1C 8.6 %   POC Glucose Fingerstick   Result Value Ref Range    Glucose 275 (A) 70 - 130 mg/dL     Goal hgn a1c 7 or less discussed  She is up to date with eye exam  Neuropathy with ingrown nail right gt toe- discussed podiatry   Has high ur alb - on arb   bp sub optimal - monitor and titrate amlodipine prn

## 2017-06-30 ENCOUNTER — TELEPHONE (OUTPATIENT)
Dept: ENDOCRINOLOGY | Facility: CLINIC | Age: 67
End: 2017-06-30

## 2017-06-30 RX ORDER — EZETIMIBE 10 MG/1
10 TABLET ORAL DAILY
Qty: 90 TABLET | Refills: 1 | Status: SHIPPED | OUTPATIENT
Start: 2017-06-30 | End: 2018-06-30